# Patient Record
Sex: MALE | Race: OTHER | HISPANIC OR LATINO | Employment: FULL TIME | URBAN - METROPOLITAN AREA
[De-identification: names, ages, dates, MRNs, and addresses within clinical notes are randomized per-mention and may not be internally consistent; named-entity substitution may affect disease eponyms.]

---

## 2023-01-05 PROBLEM — G43.009 MIGRAINE WITHOUT AURA AND WITHOUT STATUS MIGRAINOSUS, NOT INTRACTABLE: Status: ACTIVE | Noted: 2023-01-05

## 2023-01-08 PROBLEM — R42 VERTIGO: Status: ACTIVE | Noted: 2023-01-08

## 2023-01-19 PROBLEM — E78.2 MIXED HYPERLIPIDEMIA: Status: ACTIVE | Noted: 2023-01-19

## 2023-03-28 ENCOUNTER — TRANSITIONAL CARE MANAGEMENT (OUTPATIENT)
Dept: FAMILY MEDICINE CLINIC | Facility: CLINIC | Age: 43
End: 2023-03-28

## 2023-03-28 PROBLEM — E78.5 HLD (HYPERLIPIDEMIA): Status: ACTIVE | Noted: 2023-03-25

## 2023-03-28 PROBLEM — I21.4 NSTEMI, INITIAL EPISODE OF CARE (HCC): Status: ACTIVE | Noted: 2023-03-26

## 2023-03-28 PROBLEM — R07.9 CHEST PAIN: Status: ACTIVE | Noted: 2023-03-24

## 2023-03-28 RX ORDER — ATORVASTATIN CALCIUM 80 MG/1
80 TABLET, FILM COATED ORAL DAILY
COMMUNITY
Start: 2023-03-28 | End: 2023-04-04 | Stop reason: SDUPTHER

## 2023-03-28 RX ORDER — CLOPIDOGREL BISULFATE 75 MG/1
75 TABLET ORAL DAILY
COMMUNITY
Start: 2023-03-28 | End: 2023-04-04 | Stop reason: SDUPTHER

## 2023-03-28 RX ORDER — ASPIRIN 81 MG/1
81 TABLET, CHEWABLE ORAL DAILY
COMMUNITY
Start: 2023-03-28 | End: 2023-04-04 | Stop reason: SDUPTHER

## 2023-03-28 RX ORDER — NITROGLYCERIN 0.4 MG/1
1 TABLET SUBLINGUAL
COMMUNITY
Start: 2023-03-28 | End: 2023-04-04 | Stop reason: ALTCHOICE

## 2023-04-04 ENCOUNTER — OFFICE VISIT (OUTPATIENT)
Dept: FAMILY MEDICINE CLINIC | Facility: CLINIC | Age: 43
End: 2023-04-04

## 2023-04-04 VITALS
HEIGHT: 71 IN | OXYGEN SATURATION: 97 % | RESPIRATION RATE: 16 BRPM | BODY MASS INDEX: 24.22 KG/M2 | DIASTOLIC BLOOD PRESSURE: 70 MMHG | TEMPERATURE: 98 F | SYSTOLIC BLOOD PRESSURE: 102 MMHG | HEART RATE: 72 BPM | WEIGHT: 173 LBS

## 2023-04-04 DIAGNOSIS — I21.4 NSTEMI, INITIAL EPISODE OF CARE (HCC): ICD-10-CM

## 2023-04-04 DIAGNOSIS — I25.10 CORONARY ARTERY DISEASE DUE TO LIPID RICH PLAQUE: Primary | ICD-10-CM

## 2023-04-04 DIAGNOSIS — I25.83 CORONARY ARTERY DISEASE DUE TO LIPID RICH PLAQUE: Primary | ICD-10-CM

## 2023-04-04 PROBLEM — Z78.9 LANGUAGE BARRIER AFFECTING HEALTH CARE: Status: ACTIVE | Noted: 2023-04-02

## 2023-04-04 PROBLEM — I22.2: Status: ACTIVE | Noted: 2023-03-26

## 2023-04-04 PROBLEM — Z75.8 LANGUAGE BARRIER AFFECTING HEALTH CARE: Status: ACTIVE | Noted: 2023-04-02

## 2023-04-04 PROBLEM — I05.8 MITRAL VALVE MASS: Status: ACTIVE | Noted: 2023-04-02

## 2023-04-04 PROBLEM — Z60.3 LANGUAGE BARRIER AFFECTING HEALTH CARE: Status: ACTIVE | Noted: 2023-04-02

## 2023-04-04 RX ORDER — ASPIRIN 81 MG/1
81 TABLET, CHEWABLE ORAL DAILY
Qty: 90 TABLET | Refills: 3 | Status: SHIPPED | OUTPATIENT
Start: 2023-04-04 | End: 2023-07-03

## 2023-04-04 RX ORDER — CLOPIDOGREL BISULFATE 75 MG/1
75 TABLET ORAL DAILY
Qty: 90 TABLET | Refills: 3 | Status: SHIPPED | OUTPATIENT
Start: 2023-04-04 | End: 2023-07-03

## 2023-04-04 RX ORDER — ATORVASTATIN CALCIUM 80 MG/1
80 TABLET, FILM COATED ORAL DAILY
Qty: 90 TABLET | Refills: 3 | Status: SHIPPED | OUTPATIENT
Start: 2023-04-04 | End: 2023-07-03

## 2023-04-04 NOTE — PROGRESS NOTES
TCM Call     Date and time call was made  3/28/2023  1:17 PM    Hospital care reviewed  Records reviewed    Patient was hospitialized at  [facility]    Date of Admission  23    Date of discharge  23    Diagnosis  CAD S/P percutaneous coronary angioplasty    Disposition  Home    Were the patients medications reviewed and updated  Yes    Current Symptoms  None      TCM Call     Post hospital issues  None    Should patient be enrolled in anticoag monitoring? Yes    Patients specialists  Cardiologist    I have advised the patient to call PCP with any new or worsening symptoms  Mary Sullivanjanet, [address] [place]        Name: Subhash Aldridge      : 1980      MRN: [id number]  Encounter Provider: Edith Bo MD  Encounter Date: 2023   Encounter department: [facility]     1  Coronary artery disease due to lipid rich plaque  -     aspirin 81 mg chewable tablet; Chew 1 tablet (81 mg total) daily  -     atorvastatin (LIPITOR) 80 mg tablet; Take 1 tablet (80 mg total) by mouth daily  -     clopidogrel (PLAVIX) 75 mg tablet; Take 1 tablet (75 mg total) by mouth daily  -     metoprolol tartrate (LOPRESSOR) 25 mg tablet; Take 1 tablet (25 mg total) by mouth 2 (two) times a day    2   NSTEMI, initial episode of care [facility])           Subjective      HPI     Review of Systems    Current Outpatient Medications on File Prior to Visit   Medication Sig   • [DISCONTINUED] aspirin 81 mg chewable tablet Chew 81 mg daily   • [DISCONTINUED] atorvastatin (LIPITOR) 10 mg tablet Take 1 tablet (10 mg total) by mouth daily   • [DISCONTINUED] atorvastatin (LIPITOR) 80 mg tablet Take 80 mg by mouth daily   • [DISCONTINUED] clopidogrel (PLAVIX) 75 mg tablet Take 75 mg by mouth daily   • [DISCONTINUED] fluticasone (FLONASE) 50 mcg/act nasal spray 1 spray into each nostril daily   • [DISCONTINUED] loratadine (CLARITIN) 10 mg tablet Take 1 tablet (10 mg total) by "mouth daily   • [DISCONTINUED] metoprolol tartrate (LOPRESSOR) 25 mg tablet Take 25 mg by mouth 2 (two) times a day   • [DISCONTINUED] nitroglycerin (NITROSTAT) 0 4 mg SL tablet Place 1 tablet under the tongue every 5 (five) minutes as needed       Objective     /70 (BP Location: Left arm, Patient Position: Sitting, Cuff Size: Standard)   Pulse 72   Temp 98 °F (36 7 °C) (Tympanic)   Resp 16   Ht 5' 11\" (1 803 m)   Wt 78 5 kg (173 lb)   SpO2 97%   BMI 24 13 kg/m²     Physical Exam  Ariela Rangel MD  "

## 2023-04-11 PROBLEM — I21.4 NSTEMI, INITIAL EPISODE OF CARE (HCC): Status: RESOLVED | Noted: 2023-03-26 | Resolved: 2023-04-11

## 2023-04-11 PROBLEM — I25.10 CAD (CORONARY ARTERY DISEASE): Status: ACTIVE | Noted: 2023-04-11

## 2023-04-11 PROBLEM — I25.2 HISTORY OF NON-ST ELEVATION MYOCARDIAL INFARCTION (NSTEMI): Status: ACTIVE | Noted: 2023-03-26

## 2023-04-25 ENCOUNTER — PATIENT OUTREACH (OUTPATIENT)
Dept: FAMILY MEDICINE CLINIC | Facility: CLINIC | Age: 43
End: 2023-04-25

## 2023-04-25 NOTE — LETTER
04/25/23    Estimado/a Caden Sylvester un coordinador de la atención médica en [facility] [address] PR[facility] C[facility] PRIMARY CARE [facility]  [address]  [place] [id number]  [phone]  Intentamos comunicarnos con usted por teléfono varias veces  Se puede comunicar con nosotros al Dept: [phone] para que podamos ofrecerle ayuda con vicky necesidades de [employer]  Atentamente           [[facility] Name]

## 2023-04-25 NOTE — PROGRESS NOTES
2nd attempt contacting patient to follow up on status of MA application  No answer and left message  Will send unable to reach letter and close case  OP CM SW remains available if needed

## 2023-05-08 ENCOUNTER — TELEPHONE (OUTPATIENT)
Dept: FAMILY MEDICINE CLINIC | Facility: CLINIC | Age: 43
End: 2023-05-08

## 2023-05-08 DIAGNOSIS — H69.91 EUSTACHIAN TUBE DISORDER, RIGHT: Primary | ICD-10-CM

## 2023-05-08 NOTE — TELEPHONE ENCOUNTER
Patient call states he would like a referral to the Otolaryngologist he been having ear pain lately since he last visit please advised thank you

## 2023-07-19 ENCOUNTER — OFFICE VISIT (OUTPATIENT)
Dept: FAMILY MEDICINE CLINIC | Facility: CLINIC | Age: 43
End: 2023-07-19

## 2023-07-19 ENCOUNTER — APPOINTMENT (OUTPATIENT)
Dept: LAB | Facility: HOSPITAL | Age: 43
End: 2023-07-19

## 2023-07-19 VITALS
DIASTOLIC BLOOD PRESSURE: 72 MMHG | RESPIRATION RATE: 16 BRPM | TEMPERATURE: 97.9 F | HEIGHT: 71 IN | HEART RATE: 72 BPM | WEIGHT: 157 LBS | BODY MASS INDEX: 21.98 KG/M2 | OXYGEN SATURATION: 98 % | SYSTOLIC BLOOD PRESSURE: 126 MMHG

## 2023-07-19 DIAGNOSIS — I25.10 CORONARY ARTERY DISEASE INVOLVING NATIVE CORONARY ARTERY OF NATIVE HEART WITHOUT ANGINA PECTORIS: Primary | ICD-10-CM

## 2023-07-19 DIAGNOSIS — R73.03 PREDIABETES: ICD-10-CM

## 2023-07-19 DIAGNOSIS — I25.10 CORONARY ARTERY DISEASE INVOLVING NATIVE CORONARY ARTERY OF NATIVE HEART WITHOUT ANGINA PECTORIS: ICD-10-CM

## 2023-07-19 DIAGNOSIS — I10 ESSENTIAL HYPERTENSION, BENIGN: ICD-10-CM

## 2023-07-19 LAB
CHOLEST SERPL-MCNC: 239 MG/DL
EST. AVERAGE GLUCOSE BLD GHB EST-MCNC: 120 MG/DL
HBA1C MFR BLD: 5.8 %
HDLC SERPL-MCNC: 40 MG/DL
LDLC SERPL CALC-MCNC: 149 MG/DL (ref 0–100)
TRIGL SERPL-MCNC: 249 MG/DL
TSH SERPL DL<=0.05 MIU/L-ACNC: 1.82 UIU/ML (ref 0.45–4.5)

## 2023-07-19 PROCEDURE — 84443 ASSAY THYROID STIM HORMONE: CPT

## 2023-07-19 PROCEDURE — 99214 OFFICE O/P EST MOD 30 MIN: CPT | Performed by: FAMILY MEDICINE

## 2023-07-19 PROCEDURE — 80061 LIPID PANEL: CPT

## 2023-07-19 PROCEDURE — 83036 HEMOGLOBIN GLYCOSYLATED A1C: CPT

## 2023-07-19 PROCEDURE — 36415 COLL VENOUS BLD VENIPUNCTURE: CPT

## 2023-07-19 RX ORDER — MECLIZINE HYDROCHLORIDE 25 MG/1
TABLET ORAL
COMMUNITY
Start: 2023-06-21 | End: 2023-07-19 | Stop reason: ALTCHOICE

## 2023-07-19 NOTE — PROGRESS NOTES
Name: Johana Padilla      : 1980      MRN: [id number]  Encounter Provider: Nabila Eddy MD  Encounter Date: 2023   Encounter department: [address] [place] [address]     1. Coronary artery disease involving native coronary artery of native heart without angina pectoris  -     Lipid Panel with Direct LDL reflex; Future    2. Prediabetes  -     Hemoglobin A1C; Future; Expected date: 2023    3. Essential hypertension, benign  -     TSH, 3rd generation with Free T4 reflex; Future; Expected date: 2023    I explained to the patient that we did not know the reason for the pain as all the labs and imaging were normal. The main recommendation is to increase his exercise to moderate intensity. He has some anxiety and panic attacks through the night but he declines any treatment or counseling. coronary artery disease, not having lipids recently. We will review the lipid profile and request the patient to continue on Plavix, aspirin, atorvastatin, and metoprolol. Some labs reported prediabetes and requested hemoglobin A1c. Hypertension: Never checked TSH. Requesting thyroid testing. The importance of lifestyle medication was explained to the patient. I believe his weight loss is related to anxiety disorder. Subjective      HPI   Is a follow-up after patient was admitted in the emergency room on 2023 for right-sided chest pain. Patient reported having severe hypertension at home. On 2023 he was found having severe stenosis of OM2 which was believed to be the culprit of the chest pain and a stent was placed. Patient was started on aspirin, Plavix, atorvastatin and metoprolol. At least 2 other visits to the emergency room for chest pain this time the right side. The last 1 denies report any abnormalities needed the labs or the imagings.   Patient states that pain is usually during the night after meal.  He denies any acid reflux symptoms. He denies anxiety. He refuses that he is suffering of anxiety or panic attacks. He does want to know the cause of his pain. He does not exercise but is trying to walk now several times per week. Review of Systems  He reports chills but no fever, sometimes become clammy and has a tingling in the fingers and toes. He is very worried about losing weight. Current Outpatient Medications on File Prior to Visit   Medication Sig   • aspirin 81 mg chewable tablet Chew 1 tablet (81 mg total) daily   • atorvastatin (LIPITOR) 80 mg tablet Take 1 tablet (80 mg total) by mouth daily   • clopidogrel (PLAVIX) 75 mg tablet Take 1 tablet (75 mg total) by mouth daily   • metoprolol tartrate (LOPRESSOR) 25 mg tablet Take 1 tablet (25 mg total) by mouth 2 (two) times a day   • [DISCONTINUED] meclizine (ANTIVERT) 12.5 MG tablet Take 12.5 mg by mouth Three times daily as needed   • [DISCONTINUED] meclizine (ANTIVERT) 25 mg tablet        Objective     /72 (BP Location: Left arm, Patient Position: Sitting, Cuff Size: Standard)   Pulse 72   Temp 97.9 °F (36.6 °C) (Tympanic)   Resp 16   Ht 5' 11" (1.803 m)   Wt 71.2 kg (157 lb)   SpO2 98%   BMI 21.90 kg/m²     Physical Exam   He does not look in distress. He does not look anxious. Lungs are clear. Heart is normal rhythm without murmurs. The abdomen is soft. No musculoskeletal tenderness. The area where patient is pointing having the pain that is not present at this time was not tender when examined. The neurological exam is unremarkable.   Shayan Sahni MD

## 2023-07-19 NOTE — LETTER
July 19, 2023     Patient: Edd Aviles  YOB: 1980  Date of Visit: 7/19/2023      To Whom it May Concern:    Edd Aviles is under my professional care. Shane Ann was seen in my office on 7/19/2023 with the history of chest pain that happened from 07/14/2023 thorough yesterday. He was not able to work during dates [phone] through 07/16. Please excuse patient during weekend work hours. Today he is back to normal and medically cleared to return to work fully duty w/o restrictions as 07/21/2023. If you have any questions or concerns, please don't hesitate to call.           Sincerely,          Joann Hurley MD

## 2023-10-23 RX ORDER — ROSUVASTATIN CALCIUM 40 MG/1
40 TABLET, COATED ORAL DAILY
COMMUNITY
Start: 2023-07-21 | End: 2024-07-20

## 2023-10-23 RX ORDER — MECLIZINE HYDROCHLORIDE 25 MG/1
TABLET ORAL
COMMUNITY
Start: 2023-09-19 | End: 2023-10-24 | Stop reason: ALTCHOICE

## 2023-10-24 ENCOUNTER — APPOINTMENT (OUTPATIENT)
Dept: LAB | Facility: HOSPITAL | Age: 43
End: 2023-10-24

## 2023-10-24 ENCOUNTER — OFFICE VISIT (OUTPATIENT)
Dept: FAMILY MEDICINE CLINIC | Facility: CLINIC | Age: 43
End: 2023-10-24

## 2023-10-24 VITALS
OXYGEN SATURATION: 100 % | SYSTOLIC BLOOD PRESSURE: 118 MMHG | BODY MASS INDEX: 21.7 KG/M2 | DIASTOLIC BLOOD PRESSURE: 78 MMHG | WEIGHT: 155 LBS | RESPIRATION RATE: 17 BRPM | HEART RATE: 65 BPM | HEIGHT: 71 IN | TEMPERATURE: 97 F

## 2023-10-24 DIAGNOSIS — E78.2 MIXED HYPERLIPIDEMIA: ICD-10-CM

## 2023-10-24 DIAGNOSIS — I25.10 CORONARY ARTERY DISEASE INVOLVING NATIVE CORONARY ARTERY OF NATIVE HEART WITHOUT ANGINA PECTORIS: ICD-10-CM

## 2023-10-24 DIAGNOSIS — R42 VERTIGO: ICD-10-CM

## 2023-10-24 DIAGNOSIS — R63.4 WEIGHT LOSS: ICD-10-CM

## 2023-10-24 DIAGNOSIS — R63.4 WEIGHT LOSS: Primary | ICD-10-CM

## 2023-10-24 DIAGNOSIS — R73.03 PREDIABETES: ICD-10-CM

## 2023-10-24 DIAGNOSIS — R68.83 CHILLS: ICD-10-CM

## 2023-10-24 PROBLEM — R07.9 CHEST PAIN, UNSPECIFIED: Status: RESOLVED | Noted: 2023-03-24 | Resolved: 2023-10-24

## 2023-10-24 LAB
ALBUMIN SERPL BCP-MCNC: 4.9 G/DL (ref 3.5–5)
ALP SERPL-CCNC: 84 U/L (ref 34–104)
ALT SERPL W P-5'-P-CCNC: 20 U/L (ref 7–52)
ANION GAP SERPL CALCULATED.3IONS-SCNC: 8 MMOL/L
AST SERPL W P-5'-P-CCNC: 21 U/L (ref 13–39)
BASOPHILS # BLD AUTO: 0.05 THOUSANDS/ÂΜL (ref 0–0.1)
BASOPHILS NFR BLD AUTO: 1 % (ref 0–1)
BILIRUB SERPL-MCNC: 0.58 MG/DL (ref 0.2–1)
BILIRUB UR QL STRIP: NEGATIVE
BUN SERPL-MCNC: 6 MG/DL (ref 5–25)
CALCIUM SERPL-MCNC: 9.5 MG/DL (ref 8.4–10.2)
CHLORIDE SERPL-SCNC: 102 MMOL/L (ref 96–108)
CHOLEST SERPL-MCNC: 202 MG/DL
CLARITY UR: CLEAR
CO2 SERPL-SCNC: 29 MMOL/L (ref 21–32)
COLOR UR: NORMAL
CREAT SERPL-MCNC: 0.78 MG/DL (ref 0.6–1.3)
CRP SERPL QL: <1 MG/L
EOSINOPHIL # BLD AUTO: 0.09 THOUSAND/ÂΜL (ref 0–0.61)
EOSINOPHIL NFR BLD AUTO: 1 % (ref 0–6)
ERYTHROCYTE [DISTWIDTH] IN BLOOD BY AUTOMATED COUNT: 12.1 % (ref 11.6–15.1)
EST. AVERAGE GLUCOSE BLD GHB EST-MCNC: 117 MG/DL
FERRITIN SERPL-MCNC: 51 NG/ML (ref 24–336)
GFR SERPL CREATININE-BSD FRML MDRD: 109 ML/MIN/1.73SQ M
GLUCOSE P FAST SERPL-MCNC: 96 MG/DL (ref 65–99)
GLUCOSE UR STRIP-MCNC: NEGATIVE MG/DL
HBA1C MFR BLD: 5.7 %
HCT VFR BLD AUTO: 47.1 % (ref 36.5–49.3)
HDLC SERPL-MCNC: 46 MG/DL
HGB BLD-MCNC: 15.4 G/DL (ref 12–17)
HGB UR QL STRIP.AUTO: NEGATIVE
IMM GRANULOCYTES # BLD AUTO: 0.01 THOUSAND/UL (ref 0–0.2)
IMM GRANULOCYTES NFR BLD AUTO: 0 % (ref 0–2)
IRON SATN MFR SERPL: 26 % (ref 15–50)
IRON SERPL-MCNC: 92 UG/DL (ref 50–212)
KETONES UR STRIP-MCNC: NEGATIVE MG/DL
LDLC SERPL CALC-MCNC: 130 MG/DL (ref 0–100)
LEUKOCYTE ESTERASE UR QL STRIP: NEGATIVE
LYMPHOCYTES # BLD AUTO: 1.98 THOUSANDS/ÂΜL (ref 0.6–4.47)
LYMPHOCYTES NFR BLD AUTO: 25 % (ref 14–44)
MCH RBC QN AUTO: 28.8 PG (ref 26.8–34.3)
MCHC RBC AUTO-ENTMCNC: 32.7 G/DL (ref 31.4–37.4)
MCV RBC AUTO: 88 FL (ref 82–98)
MONOCYTES # BLD AUTO: 0.62 THOUSAND/ÂΜL (ref 0.17–1.22)
MONOCYTES NFR BLD AUTO: 8 % (ref 4–12)
NEUTROPHILS # BLD AUTO: 5.24 THOUSANDS/ÂΜL (ref 1.85–7.62)
NEUTS SEG NFR BLD AUTO: 65 % (ref 43–75)
NITRITE UR QL STRIP: NEGATIVE
NONHDLC SERPL-MCNC: 156 MG/DL
NRBC BLD AUTO-RTO: 0 /100 WBCS
PH UR STRIP.AUTO: 7 [PH]
PLATELET # BLD AUTO: 283 THOUSANDS/UL (ref 149–390)
PMV BLD AUTO: 10.9 FL (ref 8.9–12.7)
POTASSIUM SERPL-SCNC: 4.6 MMOL/L (ref 3.5–5.3)
PROT SERPL-MCNC: 7.7 G/DL (ref 6.4–8.4)
PROT UR STRIP-MCNC: NEGATIVE MG/DL
RBC # BLD AUTO: 5.34 MILLION/UL (ref 3.88–5.62)
SODIUM SERPL-SCNC: 139 MMOL/L (ref 135–147)
SP GR UR STRIP.AUTO: 1.01 (ref 1–1.04)
TIBC SERPL-MCNC: 360 UG/DL (ref 250–450)
TRIGL SERPL-MCNC: 130 MG/DL
TSH SERPL DL<=0.05 MIU/L-ACNC: 1.11 UIU/ML (ref 0.45–4.5)
UIBC SERPL-MCNC: 268 UG/DL (ref 155–355)
UROBILINOGEN UA: NEGATIVE MG/DL
VIT B12 SERPL-MCNC: 137 PG/ML (ref 180–914)
WBC # BLD AUTO: 7.99 THOUSAND/UL (ref 4.31–10.16)

## 2023-10-24 PROCEDURE — 84443 ASSAY THYROID STIM HORMONE: CPT

## 2023-10-24 PROCEDURE — 81003 URINALYSIS AUTO W/O SCOPE: CPT

## 2023-10-24 PROCEDURE — 85025 COMPLETE CBC W/AUTO DIFF WBC: CPT

## 2023-10-24 PROCEDURE — 99214 OFFICE O/P EST MOD 30 MIN: CPT | Performed by: PHYSICIAN ASSISTANT

## 2023-10-24 PROCEDURE — 80053 COMPREHEN METABOLIC PANEL: CPT

## 2023-10-24 PROCEDURE — 82728 ASSAY OF FERRITIN: CPT

## 2023-10-24 PROCEDURE — 80061 LIPID PANEL: CPT

## 2023-10-24 PROCEDURE — 83540 ASSAY OF IRON: CPT

## 2023-10-24 PROCEDURE — 83550 IRON BINDING TEST: CPT

## 2023-10-24 PROCEDURE — 36415 COLL VENOUS BLD VENIPUNCTURE: CPT

## 2023-10-24 PROCEDURE — 83036 HEMOGLOBIN GLYCOSYLATED A1C: CPT

## 2023-10-24 PROCEDURE — 82607 VITAMIN B-12: CPT

## 2023-10-24 PROCEDURE — 86140 C-REACTIVE PROTEIN: CPT

## 2023-10-24 NOTE — PROGRESS NOTES
Name: Socorro Villavicencio      : 1980      MRN: [id number]  Encounter Provider: Katharine Stephenson PA-C  Encounter Date: 10/24/2023   Encounter department: [address] [place] [address]     1. Weight loss  Assessment & Plan:  Intentional and unintentional.  Significant improvement in diet since NSTEMI. 20 lb weight loss in 6 months, 40 lb weight loss in 9 months. Stable weight in the past 3 months. Check labs. Normal chest x-ray in 2023. Orders:  -     HEMOGLOBIN A1C W/ EAG ESTIMATION; Future  -     C-reactive protein; Future  -     UA w Reflex to Microscopic w Reflex to Culture -Lab Collect; Future; Expected date: 10/24/2023  -     CBC and differential; Future  -     TSH, 3rd generation with Free T4 reflex; Future  -     Comprehensive metabolic panel; Future  -     Lipid panel; Future  -     Iron Panel (Includes Ferritin, Iron Sat%, Iron, and TIBC); Future  -     Vitamin B12; Future    2. Chills  Comments:  Improved since discontinuing metoprolol, no night sweats, some weight loss, still intermittent episodes, unclear cause    3. Vertigo  Assessment & Plan:  Reviewed MRI brain in 2023. No longer with frequent episodes of dizziness. No longer requiring meclizine. 4. Mixed hyperlipidemia  Assessment & Plan:  Lab Results   Component Value Date    CHOLESTEROL 202 (H) 10/24/2023    CHOLESTEROL 239 (H) 2023    CHOLESTEROL 351 (H) 2023     Lab Results   Component Value Date    HDL 46 10/24/2023    HDL 40 2023    HDL 33 (L) 2023     Lab Results   Component Value Date    TRIG 130 10/24/2023    TRIG 249 (H) 2023    TRIG 235 (H) 2023     Lab Results   Component Value Date    NONHDLC 156 10/24/2023    [facility] 318 2023     Lab Results   Component Value Date    LDLCALC 130 (H) 10/24/2023     Switch from atorvastatin 80 mg to rosuvastatin 40 mg with improvement. Follow-up with cardiology.     Orders:  -     Comprehensive metabolic panel; Future  -     Lipid panel; Future    5. Prediabetes  Assessment & Plan:  Lab Results   Component Value Date    HGBA1C 5.8 (H) 07/19/2023     Continue low-carb diet. Orders:  -     HEMOGLOBIN A1C W/ EAG ESTIMATION; Future    6. Coronary artery disease involving native coronary artery of native heart without angina pectoris  Assessment & Plan:  Continued DAPT. Reviewed last cardiology consult note in 7/2023. Follow-up cardiology with history of stent in 3/2023. Metoprolol was discontinued since last visit due to concern for causing fatigue and chills. Conner Regalado is a 38 y.o. male with a h/o CAD with stent x1, HLD who presents for follow-up for weight loss, dizziness, chills. He had similar symptoms when he saw cardiology in July and his metoprolol was discontinued. Suspected as cause of his nonspecific symptoms. He had been hospitalized in the past for concern for infectious endocarditis but antibiotics were discontinued due to normal labs a few months ago for similar chills. He reports overall improved. He has not had any dyspnea on exertion, chest pain on exertion, palpitations. No longer with same dizziness. No longer with same chest pain. No fevers, chills, cough, shortness of breath, dysuria. Previously with more frequent episodes. No night sweats. He does not have insurance. History was conducted in [place] without the use of . Review of Systems   Constitutional:  Positive for chills (Intermittent), fatigue and unexpected weight change (40 pounds in 9 months). Negative for activity change, appetite change, diaphoresis and fever. Respiratory:  Negative for cough and shortness of breath. Cardiovascular:  Negative for chest pain (Resolved), palpitations and leg swelling. Gastrointestinal:  Negative for blood in stool, constipation, diarrhea, nausea and vomiting. Genitourinary:  Negative for dysuria.    Musculoskeletal:  Negative for arthralgias and back pain. Skin:  Negative for rash. Current Outpatient Medications on File Prior to Visit   Medication Sig   • rosuvastatin (CRESTOR) 40 MG tablet Take 40 mg by mouth daily   • [DISCONTINUED] meclizine (ANTIVERT) 25 mg tablet Take one tablet as needed daily for dizziness. • aspirin 81 mg chewable tablet Chew 1 tablet (81 mg total) daily   • clopidogrel (PLAVIX) 75 mg tablet Take 1 tablet (75 mg total) by mouth daily   • [DISCONTINUED] metoprolol tartrate (LOPRESSOR) 25 mg tablet Take 1 tablet (25 mg total) by mouth 2 (two) times a day       Objective     /78 (BP Location: Left arm, Patient Position: Sitting, Cuff Size: Large)   Pulse 65   Temp (!) 97 °F (36.1 °C) (Tympanic)   Resp 17   Ht 5' 11" (1.803 m)   Wt 70.3 kg (155 lb)   SpO2 100%   BMI 21.62 kg/m²     Physical Exam  Vitals and nursing note reviewed. Constitutional:       Appearance: Normal appearance. HENT:      Head: Normocephalic and atraumatic. Eyes:      Extraocular Movements: Extraocular movements intact. Pupils: Pupils are equal, round, and reactive to light. Cardiovascular:      Rate and Rhythm: Normal rate and regular rhythm. Pulses: Normal pulses. Heart sounds: Normal heart sounds. Pulmonary:      Effort: Pulmonary effort is normal.      Breath sounds: Normal breath sounds. Musculoskeletal:         General: Normal range of motion. Cervical back: Normal range of motion and neck supple. Neurological:      Mental Status: He is alert and oriented to person, place, and time. Mental status is at baseline. Cranial Nerves: No cranial nerve deficit. Sensory: No sensory deficit. Motor: No weakness.       Gait: Gait normal.   Psychiatric:         Mood and Affect: Mood normal.         Behavior: Behavior normal.       Holli Avelar PA-C

## 2023-10-24 NOTE — ASSESSMENT & PLAN NOTE
Continued DAPT. Reviewed last cardiology consult note in 7/2023. Follow-up cardiology with history of stent in 3/2023. Metoprolol was discontinued since last visit due to concern for causing fatigue and chills.

## 2023-10-24 NOTE — ASSESSMENT & PLAN NOTE
Intentional and unintentional.  Significant improvement in diet since NSTEMI. 20 lb weight loss in 6 months, 40 lb weight loss in 9 months. Stable weight in the past 3 months. Check labs. Normal chest x-ray in 7/2023.

## 2023-10-24 NOTE — ASSESSMENT & PLAN NOTE
Reviewed MRI brain in 7/2023. No longer with frequent episodes of dizziness. No longer requiring meclizine.

## 2023-10-24 NOTE — ASSESSMENT & PLAN NOTE
Lab Results   Component Value Date    CHOLESTEROL 202 (H) 10/24/2023    CHOLESTEROL 239 (H) 07/19/2023    CHOLESTEROL 351 (H) 01/06/2023     Lab Results   Component Value Date    HDL 46 10/24/2023    HDL 40 07/19/2023    HDL 33 (L) 01/06/2023     Lab Results   Component Value Date    TRIG 130 10/24/2023    TRIG 249 (H) 07/19/2023    TRIG 235 (H) 01/06/2023     Lab Results   Component Value Date    NONHDLC 156 10/24/2023    [facility] 318 01/06/2023     Lab Results   Component Value Date    LDLCALC 130 (H) 10/24/2023     Switch from atorvastatin 80 mg to rosuvastatin 40 mg with improvement. Follow-up with cardiology.

## 2023-10-26 DIAGNOSIS — E53.8 VITAMIN B12 DEFICIENCY: Primary | ICD-10-CM

## 2023-10-26 RX ORDER — LANOLIN ALCOHOL/MO/W.PET/CERES
1000 CREAM (GRAM) TOPICAL DAILY
Qty: 90 TABLET | Refills: 3 | Status: SHIPPED | OUTPATIENT
Start: 2023-10-26

## 2024-01-22 ENCOUNTER — OFFICE VISIT (OUTPATIENT)
Dept: FAMILY MEDICINE CLINIC | Facility: CLINIC | Age: 44
End: 2024-01-22

## 2024-01-22 VITALS
TEMPERATURE: 100.7 F | BODY MASS INDEX: 21.84 KG/M2 | HEART RATE: 72 BPM | RESPIRATION RATE: 16 BRPM | DIASTOLIC BLOOD PRESSURE: 70 MMHG | OXYGEN SATURATION: 98 % | WEIGHT: 156 LBS | HEIGHT: 71 IN | SYSTOLIC BLOOD PRESSURE: 106 MMHG

## 2024-01-22 DIAGNOSIS — I25.10 CORONARY ARTERY DISEASE DUE TO LIPID RICH PLAQUE: ICD-10-CM

## 2024-01-22 DIAGNOSIS — J06.9 UPPER RESPIRATORY TRACT INFECTION, UNSPECIFIED TYPE: ICD-10-CM

## 2024-01-22 DIAGNOSIS — I25.83 CORONARY ARTERY DISEASE DUE TO LIPID RICH PLAQUE: ICD-10-CM

## 2024-01-22 DIAGNOSIS — E53.8 VITAMIN B12 DEFICIENCY: ICD-10-CM

## 2024-01-22 DIAGNOSIS — Z00.01 ENCOUNTER FOR GENERAL ADULT MEDICAL EXAMINATION WITH ABNORMAL FINDINGS: Primary | ICD-10-CM

## 2024-01-22 DIAGNOSIS — Z12.11 COLON CANCER SCREENING: ICD-10-CM

## 2024-01-22 PROCEDURE — 99396 PREV VISIT EST AGE 40-64: CPT | Performed by: FAMILY MEDICINE

## 2024-01-22 PROCEDURE — 99213 OFFICE O/P EST LOW 20 MIN: CPT | Performed by: FAMILY MEDICINE

## 2024-01-22 PROCEDURE — 87636 SARSCOV2 & INF A&B AMP PRB: CPT | Performed by: FAMILY MEDICINE

## 2024-01-22 RX ORDER — ROSUVASTATIN CALCIUM 40 MG/1
40 TABLET, COATED ORAL DAILY
Qty: 30 TABLET | Refills: 11 | Status: SHIPPED | OUTPATIENT
Start: 2024-01-22 | End: 2025-01-21

## 2024-01-22 RX ORDER — LANOLIN ALCOHOL/MO/W.PET/CERES
1000 CREAM (GRAM) TOPICAL DAILY
Qty: 90 TABLET | Refills: 3 | Status: SHIPPED | OUTPATIENT
Start: 2024-01-22

## 2024-01-22 RX ORDER — CLOPIDOGREL BISULFATE 75 MG/1
75 TABLET ORAL DAILY
Qty: 90 TABLET | Refills: 3 | Status: SHIPPED | OUTPATIENT
Start: 2024-01-22 | End: 2025-01-16

## 2024-01-22 RX ORDER — ASPIRIN 81 MG/1
81 TABLET, CHEWABLE ORAL DAILY
Qty: 90 TABLET | Refills: 3 | Status: SHIPPED | OUTPATIENT
Start: 2024-01-22 | End: 2025-01-16

## 2024-01-22 NOTE — PROGRESS NOTES
Name: Jaisno Laws      : 1980      MRN: [id number]  Encounter Provider: Rajesh Gaston MD  Encounter Date: 2024   Encounter department: [facility] CARE [facility]    Assessment & Plan     General Health Maintenance - Yearly physical exam findings are within normal limits. Reinforce the importance of regular monitoring and health maintenance visits. Recommend influenza and pneumococcal vaccines if not up to date.  Coronary Artery Disease (CAD) - Continue current regimen of aspirin and atorvastatin. Encourage ongoing lifestyle modifications including diet and exercise. Await lipid panel results to assess efficacy of therapy and need for adjustments.  Post-Non-ST Elevation Myocardial Infarction (NSTEMI) - No new cardiac symptoms reported. Continue cardiac rehabilitation and secondary prevention strategies. Schedule follow-up in 3-6 months or sooner if new symptoms arise.  Vitamin B12 def. Recheck labs. Continue p.o. Cyanocobalamin supplements.  1. Encounter for general adult medical examination with abnormal findings    2. Upper respiratory tract infection, unspecified type  -     Covid/Flu- Office Collect    3. Colon cancer screening  -     Occult Blood, Fecal Immunochemical; Future    4. Coronary artery disease due to lipid rich plaque  -     aspirin 81 mg chewable tablet; Chew 1 tablet (81 mg total) daily  -     clopidogrel (PLAVIX) 75 mg tablet; Take 1 tablet (75 mg total) by mouth daily  -     rosuvastatin (CRESTOR) 40 MG tablet; Take 1 tablet (40 mg total) by mouth daily  -     Comprehensive metabolic panel; Future  -     Lipid Panel with Direct LDL reflex; Future    5. Vitamin B12 deficiency  -     vitamin B-12 (VITAMIN B-12) 1,000 mcg tablet; Take 1 tablet (1,000 mcg total) by mouth daily  -     Vitamin B12; Future      Subjective  The patient is a 43-year-old male presenting for a routine yearly physical examination. He has a history of coronary artery disease,  "status post non-ST elevation myocardial infarction in March 2023. He reports adherence to his current medication regimen, which includes aspirin, atorvastatin, and vitamin B12. No new cardiac or other systemic symptoms have been noted since the last visit.    Vital Signs  /70 (BP Location: Left arm, Patient Position: Sitting, Cuff Size: Standard)   Pulse 72   Temp (!) 100.7 °F (38.2 °C) (Tympanic)   Resp 16   Ht 5' 11\" (1.803 m)   Wt 70.8 kg (156 lb)   SpO2 98%   BMI 21.76 kg/m²       Physical Exam  General: The patient is alert and oriented x3, in no acute distress. Cardiovascular: Regular rate and rhythm, no murmurs, rubs, or gallops. Lungs: Clear to auscultation bilaterally. No wheezes, rales, or rhonchi. Abdomen: Soft, non-tender, non-distended, no hepatosplenomegaly. Extremities: No edema, cyanosis, or clubbing.            Rajesh Gaston MD   [facility] CARE [facility]"

## 2024-01-22 NOTE — PROGRESS NOTES
COVID-19 Outpatient Progress Note    Assessment/Plan:    Problem List Items Addressed This Visit    None  Visit Diagnoses       Encounter for general adult medical examination with abnormal findings    -  Primary           Disposition:     I have spent a total time of 20 minutes on the day of the encounter for this patient including discussing prognosis, risks and benefits of treatment options, importance of treatment compliance and risk factor reductions. 10 Ways to Protect Yourself and others:  1. Clean your hands before and after using bathrooms, cleaning yourself, sneezing or cough.    2. Stay home if you are sick    3. Know if antibiotics are not appropriate, most of time are useless.    4. Follow all public and at work posted  precaution signs    5. Use personal protective equipment if needed, ex: masks    6. Get vaccinated, learn what vaccines are good for you    7. Stay away from public places during high flu seasons    8. Keep patient you family safe from infectious disease    9. Educate yourself and your love ones on Infection Prevention    10. Know your work or school facility Infection rules         Encounter provider: Rajesh Gaston MD     Provider located at: [facility] PRIMARY CARE [address]  [address] [place] [id number] [phone]     Recent Visits  No visits were found meeting these conditions.  Showing recent visits within past 7 days and meeting all other requirements  Today's Visits  Date Type Provider Dept   01/22/24 Office Visit Rajesh Gaston MD [facility]   Showing today's visits and meeting all other requirements  Future Appointments  No visits were found meeting these conditions.  Showing future appointments within next 150 days and meeting all other requirements     Subjective:   Jaison Laws is a 43 y.o. male who is concerned about COVID-19. Patient's symptoms include nasal congestion and  sore throat. Patient denies fever, chills, fatigue, malaise, rhinorrhea, anosmia, loss of taste, cough, shortness of breath, chest tightness, abdominal pain, nausea, vomiting, diarrhea, myalgias and headaches.         COVID-19 vaccination status: Fully vaccinated (primary series)    Exposure:   Contact with a person who is under investigation (PUI) for or who is positive for COVID-19 within the last 14 days?: No    Hospitalized recently for fever and/or lower respiratory symptoms?: No      Currently a healthcare worker that is involved in direct patient care?: No      Works in a special setting where the risk of COVID-19 transmission may be high? (this may include long-term care, correctional and snf facilities; homeless shelters; assisted-living facilities and group homes.): No      Resident in a special setting where the risk of COVID-19 transmission may be high? (this may include long-term care, correctional and snf facilities; homeless shelters; assisted-living facilities and group homes.): No      Lab Results   Component Value Date    SARSCORONAVI Not Detected 03/24/2023       Review of Systems   Constitutional:  Negative for chills, fatigue and fever.   HENT:  Positive for congestion and sore throat. Negative for rhinorrhea.    Respiratory:  Negative for cough, chest tightness and shortness of breath.    Gastrointestinal:  Negative for abdominal pain, diarrhea, nausea and vomiting.   Musculoskeletal:  Negative for myalgias.   Neurological:  Negative for headaches.     Current Outpatient Medications on File Prior to Visit   Medication Sig    aspirin 81 mg chewable tablet Chew 1 tablet (81 mg total) daily    clopidogrel (PLAVIX) 75 mg tablet Take 1 tablet (75 mg total) by mouth daily    rosuvastatin (CRESTOR) 40 MG tablet Take 40 mg by mouth daily    vitamin B-12 (VITAMIN B-12) 1,000 mcg tablet Take 1 tablet (1,000 mcg total) by mouth daily       Objective:    There were no vitals taken for this  visit.       Physical Exam  Constitutional:       Appearance: He is well-developed. He is ill-appearing.   HENT:      Head: Normocephalic and atraumatic.      Right Ear: Tympanic membrane normal.      Left Ear: Tympanic membrane normal.      Nose: Congestion and rhinorrhea present.      Mouth/Throat:      Mouth: Mucous membranes are moist.      Pharynx: Posterior oropharyngeal erythema present.   Eyes:      Conjunctiva/sclera: Conjunctivae normal.   Cardiovascular:      Rate and Rhythm: Normal rate and regular rhythm.   Pulmonary:      Effort: Pulmonary effort is normal.      Breath sounds: Rhonchi present.   Musculoskeletal:      Cervical back: Normal range of motion.   Skin:     General: Skin is warm and dry.   Neurological:      Mental Status: He is alert and oriented to person, place, and time.   Psychiatric:         Behavior: Behavior normal.         Thought Content: Thought content normal.         Judgment: Judgment normal.       Rajesh Gaston MD

## 2024-01-23 ENCOUNTER — LAB (OUTPATIENT)
Dept: LAB | Facility: HOSPITAL | Age: 44
End: 2024-01-23

## 2024-01-23 DIAGNOSIS — I25.10 CORONARY ARTERY DISEASE INVOLVING NATIVE CORONARY ARTERY OF NATIVE HEART WITHOUT ANGINA PECTORIS: ICD-10-CM

## 2024-01-23 DIAGNOSIS — I25.83 CORONARY ARTERY DISEASE DUE TO LIPID RICH PLAQUE: ICD-10-CM

## 2024-01-23 DIAGNOSIS — U07.1 COVID-19: ICD-10-CM

## 2024-01-23 DIAGNOSIS — I25.10 CORONARY ARTERY DISEASE DUE TO LIPID RICH PLAQUE: ICD-10-CM

## 2024-01-23 DIAGNOSIS — J10.1 INFLUENZA A: Primary | ICD-10-CM

## 2024-01-23 DIAGNOSIS — E53.8 VITAMIN B12 DEFICIENCY: ICD-10-CM

## 2024-01-23 LAB
ALBUMIN SERPL BCP-MCNC: 4.4 G/DL (ref 3.5–5)
ALP SERPL-CCNC: 60 U/L (ref 34–104)
ALT SERPL W P-5'-P-CCNC: 17 U/L (ref 7–52)
ANION GAP SERPL CALCULATED.3IONS-SCNC: 8 MMOL/L
AST SERPL W P-5'-P-CCNC: 20 U/L (ref 13–39)
BILIRUB SERPL-MCNC: 0.47 MG/DL (ref 0.2–1)
BUN SERPL-MCNC: 7 MG/DL (ref 5–25)
CALCIUM SERPL-MCNC: 9.3 MG/DL (ref 8.4–10.2)
CHLORIDE SERPL-SCNC: 100 MMOL/L (ref 96–108)
CHOLEST SERPL-MCNC: 225 MG/DL
CO2 SERPL-SCNC: 31 MMOL/L (ref 21–32)
CREAT SERPL-MCNC: 0.8 MG/DL (ref 0.6–1.3)
FLUAV RNA RESP QL NAA+PROBE: NEGATIVE
FLUBV RNA RESP QL NAA+PROBE: POSITIVE
GFR SERPL CREATININE-BSD FRML MDRD: 107 ML/MIN/1.73SQ M
GLUCOSE P FAST SERPL-MCNC: 99 MG/DL (ref 65–99)
HDLC SERPL-MCNC: 48 MG/DL
LDLC SERPL CALC-MCNC: 155 MG/DL (ref 0–100)
POTASSIUM SERPL-SCNC: 4.1 MMOL/L (ref 3.5–5.3)
PROT SERPL-MCNC: 7.4 G/DL (ref 6.4–8.4)
SARS-COV-2 RNA RESP QL NAA+PROBE: POSITIVE
SODIUM SERPL-SCNC: 139 MMOL/L (ref 135–147)
TRIGL SERPL-MCNC: 111 MG/DL
VIT B12 SERPL-MCNC: 332 PG/ML (ref 180–914)

## 2024-01-23 PROCEDURE — 80053 COMPREHEN METABOLIC PANEL: CPT

## 2024-01-23 PROCEDURE — 80061 LIPID PANEL: CPT

## 2024-01-23 PROCEDURE — 36415 COLL VENOUS BLD VENIPUNCTURE: CPT

## 2024-01-23 PROCEDURE — 82607 VITAMIN B-12: CPT

## 2024-01-23 RX ORDER — NIRMATRELVIR AND RITONAVIR 300-100 MG
3 KIT ORAL 2 TIMES DAILY
Qty: 30 TABLET | Refills: 0 | Status: SHIPPED | OUTPATIENT
Start: 2024-01-23 | End: 2024-01-28

## 2024-01-23 RX ORDER — OSELTAMIVIR PHOSPHATE 75 MG/1
75 CAPSULE ORAL 2 TIMES DAILY
Qty: 10 CAPSULE | Refills: 0 | Status: SHIPPED | OUTPATIENT
Start: 2024-01-23 | End: 2024-01-28

## 2024-01-23 NOTE — LETTER
January 23, 2024     Patient: Jaison Laws  YOB: 1980  Date of Visit: 1/23/2024      To Whom it May Concern:    Jaison Laws is under my professional care.     Patient is unable to work due to infected with Influenza A and Covid 19 at the same time.  Return to work  Saturday January 27th 2024.    If you have any questions or concerns, please don't hesitate to call.          Sincerely,          Rajesh Gaston MD

## 2024-01-23 NOTE — PROGRESS NOTES
Spoke with patient.    1. Influenza A    - oseltamivir (TAMIFLU) 75 mg capsule; Take 1 capsule (75 mg total) by mouth 2 (two) times a day for 5 days  Dispense: 10 capsule; Refill: 0    2. COVID-19    - nirmatrelvir & ritonavir (Paxlovid, 300/100,) tablet therapy pack; Take 3 tablets by mouth 2 (two) times a day for 5 days Take 2 nirmatrelvir tablets + 1 ritonavir tablet together per dose  Dispense: 30 tablet; Refill: 0    3. Coronary artery disease involving native coronary artery of native heart without angina pectoris    - nirmatrelvir & ritonavir (Paxlovid, 300/100,) tablet therapy pack; Take 3 tablets by mouth 2 (two) times a day for 5 days Take 2 nirmatrelvir tablets + 1 ritonavir tablet together per dose  Dispense: 30 tablet; Refill: 0      Rajesh Gaston MD   [facility] CARE [facility]

## 2024-01-25 DIAGNOSIS — I25.10 CORONARY ARTERY DISEASE INVOLVING NATIVE CORONARY ARTERY OF NATIVE HEART WITHOUT ANGINA PECTORIS: Primary | ICD-10-CM

## 2024-01-25 RX ORDER — EZETIMIBE 10 MG/1
10 TABLET ORAL DAILY
Qty: 90 TABLET | Refills: 3 | Status: SHIPPED | OUTPATIENT
Start: 2024-01-25 | End: 2025-01-19

## 2024-01-25 NOTE — RESULT ENCOUNTER NOTE
Please call patient, labs reported high cholesterol and he needs to continue taking rosuvastatin 40 mg every day and a new medication called Ezetimibe 10 mg every day.  Ezetimibe was sent to his pharmacy.   Please ask how he is doing from the infection and COVID and flu at the same time.

## 2024-04-19 ENCOUNTER — TELEPHONE (OUTPATIENT)
Dept: FAMILY MEDICINE CLINIC | Facility: CLINIC | Age: 44
End: 2024-04-19

## 2024-04-19 NOTE — TELEPHONE ENCOUNTER
Patient dropped off FMLA forms for requested date 4/11/24. I have not seen patient since 10/2023 and did not indicate any need for leave of absence or FMLA. He saw Dr. Gaston on 1/22/24 for routine physical and positive COVID and flu B. [facility] - please inform patient that I will not complete his forms and he there is no medical indication for leave of absence without a visit - please also confirm if he is switching PCP to [facility].

## 2024-04-19 NOTE — TELEPHONE ENCOUNTER
Patient has been informed he must schedule office visit to discus FMLA forms.    Patient has been schedule for 4/29/24.  Patient aware of SP estimate. SP estimate saved.

## 2024-04-23 ENCOUNTER — TELEPHONE (OUTPATIENT)
Dept: FAMILY MEDICINE CLINIC | Facility: CLINIC | Age: 44
End: 2024-04-23

## 2024-04-23 NOTE — TELEPHONE ENCOUNTER
Pt called multiples times regarding FMLA papers not able to be filled out by provider unable to contact m for pt to call office back